# Patient Record
Sex: MALE | Race: BLACK OR AFRICAN AMERICAN | NOT HISPANIC OR LATINO | URBAN - METROPOLITAN AREA
[De-identification: names, ages, dates, MRNs, and addresses within clinical notes are randomized per-mention and may not be internally consistent; named-entity substitution may affect disease eponyms.]

---

## 2020-07-20 NOTE — PATIENT DISCUSSION
MERA/K SICCA, OU - PRESCRIBED ARTIFICIAL TEARS BID - QID, OU AND THE DAILY INTAKE OF OMEGA 3/FATTY ACIDS. RECOMMEND SLEEPING MASK/GEL QHS OU. CONSIDER OTHER TREATMENT OPTIONS IF SYMPTOMS PERSIST/WORSEN. FOLLOW.

## 2021-11-12 ENCOUNTER — PREPPED CHART (OUTPATIENT)
Dept: URBAN - METROPOLITAN AREA CLINIC 19 | Facility: CLINIC | Age: 81
End: 2021-11-12

## 2022-05-13 ENCOUNTER — ESTABLISHED PATIENT (OUTPATIENT)
Dept: URBAN - METROPOLITAN AREA CLINIC 19 | Facility: CLINIC | Age: 82
End: 2022-05-13

## 2022-05-13 DIAGNOSIS — H35.3131: ICD-10-CM

## 2022-05-13 DIAGNOSIS — H25.813: ICD-10-CM

## 2022-05-13 PROCEDURE — 92014 COMPRE OPH EXAM EST PT 1/>: CPT

## 2022-05-13 PROCEDURE — 92134 CPTRZ OPH DX IMG PST SGM RTA: CPT

## 2022-05-13 ASSESSMENT — TONOMETRY
OS_IOP_MMHG: 8
OD_IOP_MMHG: 14
OD_IOP_MMHG: 13
OS_IOP_MMHG: 10

## 2022-05-13 ASSESSMENT — VISUAL ACUITY
OD_SC: 20/30-2
OD_SC: 20/25
OS_SC: 20/40-1
OS_SC: 20/40

## 2022-11-11 ENCOUNTER — CONSULTATION/EVALUATION (OUTPATIENT)
Dept: URBAN - METROPOLITAN AREA CLINIC 19 | Facility: CLINIC | Age: 82
End: 2022-11-11

## 2022-11-11 DIAGNOSIS — H25.813: ICD-10-CM

## 2022-11-11 DIAGNOSIS — H35.363: ICD-10-CM

## 2022-11-11 PROCEDURE — 92014 COMPRE OPH EXAM EST PT 1/>: CPT

## 2022-11-11 PROCEDURE — 92134 CPTRZ OPH DX IMG PST SGM RTA: CPT

## 2022-11-11 PROCEDURE — 92136 OPHTHALMIC BIOMETRY: CPT

## 2022-11-11 ASSESSMENT — VISUAL ACUITY
OD_GLARE: 5/200
OS_GLARE: 20/80
OD_SC: 20/50
OS_SC: 20/40

## 2022-11-11 ASSESSMENT — TONOMETRY
OD_IOP_MMHG: 14
OS_IOP_MMHG: 14

## 2022-11-11 NOTE — PATIENT DISCUSSION
PT declined use of toric lens at time of cataract Surgery. PT is aware of need for glasses at all distances Post Cataract Surgery.

## 2022-11-11 NOTE — PATIENT DISCUSSION
Surgery Counseling: I have discussed the option of scheduling surgery versus following, as well as the risks, benefits, and alternatives of cataract surgery with the patient. It was explained that the surgery is medically indicated at this time, and it can be performed at the patient's option as delaying will cause no further deterioration, therefore there is no rush and there is no harm in waiting to have surgery. It was also explained that there is no guarantee that removing the cataract will improve their vision. The patient understands and desires to proceed with cataract surgery with the implantation of an intraocular lens to improve vision . I have given the patient the prescribed regimen of the all-in-one drop to use before and after cataract surgery. They have elected to use the all-in-one option of Pred/Gati/Brom (prednisolone acetate, gatifloxacin, and bromfenac. Patient to administer as directed.

## 2023-01-26 ENCOUNTER — POST-OP (OUTPATIENT)
Dept: URBAN - METROPOLITAN AREA CLINIC 20 | Facility: CLINIC | Age: 83
End: 2023-01-26

## 2023-01-26 DIAGNOSIS — Z96.1: ICD-10-CM

## 2023-01-26 PROCEDURE — 99024 POSTOP FOLLOW-UP VISIT: CPT

## 2023-01-26 ASSESSMENT — VISUAL ACUITY
OU_SC: 20/30
OU_SC: J2
OD_SC: 20/50+2
OS_SC: 20/50+2

## 2023-01-26 ASSESSMENT — TONOMETRY
OD_IOP_MMHG: 21
OS_IOP_MMHG: 15

## 2023-04-14 ENCOUNTER — POST-OP (OUTPATIENT)
Dept: URBAN - METROPOLITAN AREA CLINIC 19 | Facility: CLINIC | Age: 83
End: 2023-04-14

## 2023-04-14 DIAGNOSIS — Z96.1: ICD-10-CM

## 2023-04-14 PROCEDURE — 99024 POSTOP FOLLOW-UP VISIT: CPT

## 2023-04-14 ASSESSMENT — VISUAL ACUITY
OU_SC: J1-2
OS_SC: 20/40
OD_PH: 20/40
OU_SC: 20/40+2
OD_SC: 20/50+1

## 2023-04-14 ASSESSMENT — TONOMETRY
OS_IOP_MMHG: 8
OD_IOP_MMHG: 8

## 2023-05-01 ENCOUNTER — COMPREHENSIVE EXAM (OUTPATIENT)
Dept: URBAN - METROPOLITAN AREA CLINIC 19 | Facility: CLINIC | Age: 83
End: 2023-05-01

## 2023-05-01 DIAGNOSIS — H52.4: ICD-10-CM

## 2023-05-01 PROCEDURE — 92015 DETERMINE REFRACTIVE STATE: CPT

## 2023-05-01 ASSESSMENT — KERATOMETRY
OD_K2POWER_DIOPTERS: 42.12
OS_AXISANGLE2_DEGREES: 79
OD_AXISANGLE2_DEGREES: 90
OS_K2POWER_DIOPTERS: 42.62
OS_AXISANGLE_DEGREES: 169
OD_K1POWER_DIOPTERS: 43.62
OS_K1POWER_DIOPTERS: 43.87
OD_AXISANGLE_DEGREES: 180

## 2023-05-01 ASSESSMENT — VISUAL ACUITY
OD_CC: 20/30
OS_CC: 20/40-1
OU_CC: 20/20
OS_PH: 20/40+2

## 2023-05-01 ASSESSMENT — TONOMETRY
OS_IOP_MMHG: 9
OD_IOP_MMHG: 10

## 2024-05-01 ENCOUNTER — ESTABLISHED PATIENT (OUTPATIENT)
Dept: URBAN - METROPOLITAN AREA CLINIC 19 | Facility: CLINIC | Age: 84
End: 2024-05-01

## 2024-05-01 DIAGNOSIS — H16.223: ICD-10-CM

## 2024-05-01 DIAGNOSIS — H52.4: ICD-10-CM

## 2024-05-01 DIAGNOSIS — H35.363: ICD-10-CM

## 2024-05-01 PROCEDURE — 92015 DETERMINE REFRACTIVE STATE: CPT

## 2024-05-01 PROCEDURE — 92014 COMPRE OPH EXAM EST PT 1/>: CPT

## 2024-05-01 ASSESSMENT — VISUAL ACUITY
OU_CC: 20/20
OD_CC: 20/40
OS_CC: 20/40

## 2024-05-01 ASSESSMENT — KERATOMETRY
OS_K1POWER_DIOPTERS: 44
OS_AXISANGLE_DEGREES: 160
OD_K1POWER_DIOPTERS: 43.75
OD_K2POWER_DIOPTERS: 42.5
OS_AXISANGLE2_DEGREES: 70
OS_K2POWER_DIOPTERS: 42.75
OD_AXISANGLE_DEGREES: 168
OD_AXISANGLE2_DEGREES: 78

## 2024-05-01 ASSESSMENT — TONOMETRY
OS_IOP_MMHG: 8
OD_IOP_MMHG: 9

## 2025-05-05 ENCOUNTER — COMPREHENSIVE EXAM (OUTPATIENT)
Age: 85
End: 2025-05-05

## 2025-05-05 DIAGNOSIS — H35.363: ICD-10-CM

## 2025-05-05 DIAGNOSIS — H43.813: ICD-10-CM

## 2025-05-05 DIAGNOSIS — H52.4: ICD-10-CM

## 2025-05-05 DIAGNOSIS — H11.043: ICD-10-CM

## 2025-05-05 DIAGNOSIS — H16.223: ICD-10-CM

## 2025-05-05 PROCEDURE — 92015 DETERMINE REFRACTIVE STATE: CPT

## 2025-05-05 PROCEDURE — 92014 COMPRE OPH EXAM EST PT 1/>: CPT
